# Patient Record
Sex: MALE | Race: WHITE | NOT HISPANIC OR LATINO | Employment: OTHER | URBAN - METROPOLITAN AREA
[De-identification: names, ages, dates, MRNs, and addresses within clinical notes are randomized per-mention and may not be internally consistent; named-entity substitution may affect disease eponyms.]

---

## 2017-02-27 ENCOUNTER — TELEPHONE (OUTPATIENT)
Dept: NEUROLOGY | Facility: MEDICAL CENTER | Age: 72
End: 2017-02-27

## 2017-03-29 ENCOUNTER — OFFICE VISIT (OUTPATIENT)
Dept: NEUROLOGY | Facility: MEDICAL CENTER | Age: 72
End: 2017-03-29
Payer: MEDICARE

## 2017-03-29 VITALS
DIASTOLIC BLOOD PRESSURE: 78 MMHG | RESPIRATION RATE: 16 BRPM | HEART RATE: 77 BPM | BODY MASS INDEX: 25.45 KG/M2 | SYSTOLIC BLOOD PRESSURE: 138 MMHG | OXYGEN SATURATION: 97 % | WEIGHT: 192 LBS | TEMPERATURE: 98.7 F | HEIGHT: 73 IN

## 2017-03-29 DIAGNOSIS — G20.A1 PARKINSON'S DISEASE: ICD-10-CM

## 2017-03-29 PROCEDURE — 4040F PNEUMOC VAC/ADMIN/RCVD: CPT | Mod: 8P

## 2017-03-29 PROCEDURE — G8419 CALC BMI OUT NRM PARAM NOF/U: HCPCS

## 2017-03-29 PROCEDURE — 1101F PT FALLS ASSESS-DOCD LE1/YR: CPT | Mod: 8P

## 2017-03-29 PROCEDURE — G8432 DEP SCR NOT DOC, RNG: HCPCS

## 2017-03-29 PROCEDURE — 4004F PT TOBACCO SCREEN RCVD TLK: CPT | Mod: 8P

## 2017-03-29 PROCEDURE — 3017F COLORECTAL CA SCREEN DOC REV: CPT | Mod: 8P

## 2017-03-29 PROCEDURE — G8484 FLU IMMUNIZE NO ADMIN: HCPCS

## 2017-03-29 PROCEDURE — 99213 OFFICE O/P EST LOW 20 MIN: CPT

## 2017-03-29 RX ORDER — CARBIDOPA, LEVODOPA AND ENTACAPONE 25; 200; 100 MG/1; MG/1; MG/1
1 TABLET, FILM COATED ORAL 4 TIMES DAILY
Qty: 120 TAB | Refills: 5 | Status: SHIPPED | OUTPATIENT
Start: 2017-03-29

## 2017-03-29 NOTE — MR AVS SNAPSHOT
"        Mo Nair   3/29/2017 10:00 AM   Office Visit   MRN: [id number]    Department:  Neurology [facility]   Dept Phone:  [phone]    Description:  Male : 1945   Provider:  Abelardo Patel M.D.           Reason for Visit     Follow-Up bilateral foot pain and numbness      Allergies as of 3/29/2017     Allergen Noted Reactions    Sulfa Drugs 2016   Nausea      You were diagnosed with     Parkinson's disease (CMS-HCC)   [[id number]]         Vital Signs     Blood Pressure Pulse Temperature Respirations Height Weight    138/78 mmHg 77 37.1 °C (98.7 °F) 16 1.854 m (6' 0.99\") 87.091 kg (192 lb)    Body Mass Index Oxygen Saturation                25.34 kg/m2 97%          Basic Information     Date Of Birth Sex Race Ethnicity Preferred Language    1945 Male White Non- English      Your appointments     Oct 04, 2017 10:00 AM   Follow Up Visit with Abelardo Patel M.D.   [facility] (--)    [address] [place] [address], [address]  [facility] [id number] [phone]           You will be receiving a confirmation call a few days before your appointment from our automated call confirmation system.              Health Maintenance        Date Due Completion Dates    IMM DTaP/Tdap/Td Vaccine (1 - Tdap) 1966 ---    COLONOSCOPY 1997 ---    IMM ZOSTER VACCINE 2007 ---    IMM PNEUMOCOCCAL 65+ (ADULT) LOW/MEDIUM RISK SERIES (1 of 2 - PCV13) 2012 ---    IMM INFLUENZA (1) 2016 ---            Current Immunizations     No immunizations on file.      Below and/or attached are the medications your provider expects you to take. Review all of your home medications and newly ordered medications with your provider and/or pharmacist. Follow medication instructions as directed by your provider and/or pharmacist. Please keep your medication list with you and share with your provider. Update the information when medications are discontinued, doses are changed, or new medications " (including over-the-counter products) are added; and carry medication information at all times in the event of emergency situations     Allergies:  SULFA DRUGS - Nausea               Medications  Valid as of: March 29, 2017 - 10:28 AM    Generic Name Brand Name Tablet Size Instructions for use    Carbidopa-Levodopa (Tab) SINEMET  MG Take 1 Tab by mouth 4 times a day.        Carbidopa-Levodopa-Entacapone (Tab) Carbidopa-Levodopa-Entacapone -200 MG Take 1 Tab by mouth 4 times a day.        Entacapone (Tab) COMTAN 200 MG Take 1 Tab by mouth 3 times a day.        Escitalopram Oxalate (Tab) LEXAPRO 10 MG Take 1 Tab by mouth every day.        Ibuprofen (Tab) MOTRIN 200 MG Take 200 mg by mouth 4 times a day.        Levothyroxine Sodium (Tab) SYNTHROID 75 MCG Take 75 mcg by mouth Every morning on an empty stomach.        Rasagiline Mesylate (Tab) AZILECT 1 MG Take 1 mg by mouth every day.        Tamsulosin HCl (Cap) FLOMAX 0.4 MG Take 0.4 mg by mouth ONE-HALF HOUR AFTER BREAKFAST.        Zolpidem Tartrate (Tab) AMBIEN 5 MG Take 1 Tab by mouth at bedtime as needed for Sleep.        .                 Medicines prescribed today were sent to:     [id number] [address] & [address] [address]    Phone: [phone] Fax: [phone]    Open 24 Hours?: No      Medication refill instructions:       If your prescription bottle indicates you have medication refills left, it is not necessary to call your provider’s office. Please contact your pharmacy and they will refill your medication.    If your prescription bottle indicates you do not have any refills left, you may request refills at any time through one of the following ways: The online [employer] system (except Urgent Care), by calling your provider’s office, or by asking your pharmacy to contact your provider’s office with a refill request. Medication refills are processed only during  regular business hours and may not be available until the next business day. Your provider may request additional information or to have a follow-up visit with you prior to refilling your medication.   *Please Note: Medication refills are assigned a new Rx number when refilled electronically. Your pharmacy may indicate that no refills were authorized even though a new prescription for the same medication is available at the pharmacy. Please request the medicine by name with the pharmacy before contacting your provider for a refill.           [employer] Access Code: [email or web address]  Expires: 4/28/2017  9:16 AM    [employer]  A secure, online tool to manage your health information     [employer]’s [employer]® is a secure, online tool that connects you to your personalized health information from the privacy of your home -- day or night - making it very easy for you to manage your healthcare. Once the activation process is completed, you can even access your medical information using the [employer] sarai, which is available for free in the Apple Sarai store or Google Play store.     [employer] provides the following levels of access (as shown below):   My Chart Features   [facility] Doctor [facility]  Specialists [facility]  Urgent  Care Non-[facility]  Primary Care  Doctor   Email your healthcare team securely and privately 24/7 X X X    Manage appointments: schedule your next appointment; view details of past/upcoming appointments X      Request prescription refills. X      View recent personal medical records, including lab and immunizations X X X X   View health record, including health history, allergies, medications X X X X   Read reports about your outpatient visits, procedures, consult and ER notes X X X X   See your discharge summary, which is a recap of your hospital and/or ER visit that includes your diagnosis, lab results, and care plan. X X       How to register for [employer]:  1. Go to  [email or web address].  2. Click  on the Sign Up Now box, which takes you to the New Member Sign Up page. You will need to provide the following information:  a. Enter your [employer] Access Code exactly as it appears at the top of this page. (You will not need to use this code after you’ve completed the sign-up process. If you do not sign up before the expiration date, you must request a new code.)   b. Enter your date of birth.   c. Enter your home email address.   d. Click Submit, and follow the next screen’s instructions.  3. Create a [employer] ID. This will be your [employer] login ID and cannot be changed, so think of one that is secure and easy to remember.  4. Create a [employer] password. You can change your password at any time.  5. Enter your Password Reset Question and Answer. This can be used at a later time if you forget your password.   6. Enter your e-mail address. This allows you to receive e-mail notifications when new information is available in [employer].  7. Click Sign Up. You can now view your health information.    For assistance activating your [employer] account, call [phone]
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
## 2017-08-28 ENCOUNTER — TELEPHONE (OUTPATIENT)
Dept: NEUROLOGY | Facility: MEDICAL CENTER | Age: 72
End: 2017-08-28

## 2017-08-28 NOTE — TELEPHONE ENCOUNTER
Called in   Carbidopa-Levodopa-Entacapone -200 MG Tab 120 Tab 5/5 3/29/2017     Sig - Route: Take 1 Tab by mouth 4 times a day. - Oral      To   [facility] - [place] - [address] [phone] (Phone)  [phone] (Fax)

## 2017-11-09 ENCOUNTER — TELEPHONE (OUTPATIENT)
Dept: NEUROLOGY | Facility: MEDICAL CENTER | Age: 72
End: 2017-11-09

## 2017-11-10 NOTE — TELEPHONE ENCOUNTER
Pt calling, states he has moved to [place] and is requesting records. He has called MR dept many times with no call back. Pt was informed that keep on calling MR dept. Pt also requesting asking If he can get refills on his meds till he establishes in [place]. Pt was informed that he can get refills for until Dr. Reyes is in office after that he will need to re-establish with a new neurologist to get refills. Pt states he will re-establish with a neurologist in [place].

## 2017-11-30 ENCOUNTER — TELEPHONE (OUTPATIENT)
Dept: NEUROLOGY | Facility: MEDICAL CENTER | Age: 72
End: 2017-11-30

## 2017-11-30 NOTE — TELEPHONE ENCOUNTER
Received a call from the pt, he is requesting a referral to another neurologist in [place]. Informed the pt that we can not send a referral to another neurologist. The referral would need to come from his PCP. Pt and his wife tell me that pt has not established with a PCP and was wondering if Dr. Reyes can refer as Dr. Reyes is retiring and that's the least he can do for the pt. Pt was informed to go to Urgent care to get a referral if he does not have a PCP. Pt and wife were both upset.

## 2017-12-01 DIAGNOSIS — G20.A1 PARKINSON DISEASE: ICD-10-CM

## 2017-12-05 NOTE — TELEPHONE ENCOUNTER
Pt called back on my VM left info of the office he is looking to be referred to. Info forwarded to Valerie in the referral dept.     Dr. Lashay [address]  [place]   Phone # [phone]

## 2017-12-05 NOTE — TELEPHONE ENCOUNTER
Abelardo Patel M.D.   You 4 days ago      Referral done (Routing comment)       Abelardo Patel M.D.   You 4 days ago      I see no reason we can't refer him either to one of our group or to [place]. I don't personally know a neurologist in [place] but a general referral to a movement disorder clinic would be acceptable (Routing comment)      Left pt message yesterday and today, asking him to call me and let me know where he would like the referral to go to. Also notified Valerie in the referral dept about this.

## 2017-12-11 ENCOUNTER — TELEPHONE (OUTPATIENT)
Dept: NEUROLOGY | Facility: MEDICAL CENTER | Age: 72
End: 2017-12-11

## 2020-06-19 NOTE — TELEPHONE ENCOUNTER
Per pt's request rx for Azilect 1 MG Qty 30 with 4 refills called into the [facility]. Pt was informed.    19-Jun-2020 14:24